# Patient Record
Sex: FEMALE | Race: BLACK OR AFRICAN AMERICAN | NOT HISPANIC OR LATINO
[De-identification: names, ages, dates, MRNs, and addresses within clinical notes are randomized per-mention and may not be internally consistent; named-entity substitution may affect disease eponyms.]

---

## 2019-10-30 ENCOUNTER — FORM ENCOUNTER (OUTPATIENT)
Age: 59
End: 2019-10-30

## 2019-11-05 ENCOUNTER — FORM ENCOUNTER (OUTPATIENT)
Age: 59
End: 2019-11-05

## 2019-11-14 ENCOUNTER — FORM ENCOUNTER (OUTPATIENT)
Age: 59
End: 2019-11-14

## 2019-11-18 ENCOUNTER — FORM ENCOUNTER (OUTPATIENT)
Age: 59
End: 2019-11-18

## 2019-12-01 ENCOUNTER — FORM ENCOUNTER (OUTPATIENT)
Age: 59
End: 2019-12-01

## 2019-12-03 ENCOUNTER — FORM ENCOUNTER (OUTPATIENT)
Age: 59
End: 2019-12-03

## 2020-01-26 ENCOUNTER — FORM ENCOUNTER (OUTPATIENT)
Age: 60
End: 2020-01-26

## 2020-02-09 ENCOUNTER — FORM ENCOUNTER (OUTPATIENT)
Age: 60
End: 2020-02-09

## 2020-08-18 ENCOUNTER — FORM ENCOUNTER (OUTPATIENT)
Age: 60
End: 2020-08-18

## 2020-08-19 ENCOUNTER — FORM ENCOUNTER (OUTPATIENT)
Age: 60
End: 2020-08-19

## 2021-02-08 PROBLEM — Z00.00 ENCOUNTER FOR PREVENTIVE HEALTH EXAMINATION: Status: ACTIVE | Noted: 2021-02-08

## 2021-02-11 PROBLEM — Z87.42 HISTORY OF OVARIAN CYST: Status: RESOLVED | Noted: 2021-02-11 | Resolved: 2021-02-11

## 2021-02-11 PROBLEM — F17.200 CURRENT SMOKER: Status: ACTIVE | Noted: 2021-02-11

## 2021-02-17 ENCOUNTER — APPOINTMENT (OUTPATIENT)
Dept: BREAST CENTER | Facility: CLINIC | Age: 61
End: 2021-02-17
Payer: COMMERCIAL

## 2021-02-17 VITALS
HEIGHT: 64 IN | WEIGHT: 152.25 LBS | BODY MASS INDEX: 25.99 KG/M2 | SYSTOLIC BLOOD PRESSURE: 141 MMHG | DIASTOLIC BLOOD PRESSURE: 84 MMHG | HEART RATE: 79 BPM

## 2021-02-17 DIAGNOSIS — F17.200 NICOTINE DEPENDENCE, UNSPECIFIED, UNCOMPLICATED: ICD-10-CM

## 2021-02-17 DIAGNOSIS — Z87.42 PERSONAL HISTORY OF OTHER DISEASES OF THE FEMALE GENITAL TRACT: ICD-10-CM

## 2021-02-17 PROCEDURE — 99214 OFFICE O/P EST MOD 30 MIN: CPT

## 2021-02-17 PROCEDURE — 99072 ADDL SUPL MATRL&STAF TM PHE: CPT

## 2021-02-17 RX ORDER — ACETAMINOPHEN AND CODEINE PHOSPHATE 300; 30 MG/1; MG/1
300-30 TABLET ORAL
Refills: 0 | Status: DISCONTINUED | COMMUNITY
End: 2021-02-17

## 2021-02-17 NOTE — PHYSICAL EXAM
[de-identified] : Bilateral Breast/Axilla/Supraclavicular Area: no masses, discharge, or adenopathy\par

## 2021-02-17 NOTE — PAST MEDICAL HISTORY
[Menarche Age ____] : age at menarche was [unfilled] [Menopause Age____] : age at menopause was [unfilled] [Total Preg ___] : G[unfilled] [Live Births ___] : P[unfilled]  [Age At Live Birth ___] : Age at live birth: [unfilled] [History of Hormone Replacement Treatment] : has no history of hormone replacement treatment [FreeTextEntry2] : 2 miscarriages [FreeTextEntry6] : never [FreeTextEntry7] : never [FreeTextEntry8] : no

## 2021-02-17 NOTE — HISTORY OF PRESENT ILLNESS
[FreeTextEntry1] : 19 Patient presents for breast breast surveillance. 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 s/p RT, on AI\par  Denies breast pain, palpable lumps, or nipple discharge. Family hx negative for breast cancer.\par  2019 B/L screening MG: L lower inner quad Asymmetric nodular density. BIRADS 0.\par  10/31/19 L diag M.8 cm partially lobulated and partially spiculated nodule in lower central region\par  L Diag US - 9:00 1.0 cm hypoechoic mass with indistinct margins and distal acoustic shadowing.\par  BIRADS 5. US guided biopsy recommended.\par  19 L US guided core bx - IDC poorly diff w/ focal micropapillary and apocrine features. ER + AL + HER- Concordant.\par  2019. L NL lump and SLNB - IDC, mod diff 1.3cm & DCIS, left new lateral margin DCIS, left axillary SLN negative for metastatic CA.\par 20: [facility] & US: scattered areas of fibroglandular density, L - 4cm mass at lumpx site c/w seroma, R - benign intramammary LN, US - L - 2.5cm complex cystic mass c/w post-op seroma 8:00 12FN. BIRADS 3. \par left us 6 month follow up not done

## 2021-08-25 PROBLEM — Z85.3 HISTORY OF LEFT BREAST CANCER: Status: RESOLVED | Noted: 2021-08-25 | Resolved: 2021-08-25

## 2021-08-25 PROBLEM — N64.9 DISORDER OF BREAST, UNSPECIFIED: Status: ACTIVE | Noted: 2021-02-11

## 2021-09-01 ENCOUNTER — APPOINTMENT (OUTPATIENT)
Dept: BREAST CENTER | Facility: CLINIC | Age: 61
End: 2021-09-01
Payer: COMMERCIAL

## 2021-09-01 VITALS
SYSTOLIC BLOOD PRESSURE: 135 MMHG | WEIGHT: 158 LBS | DIASTOLIC BLOOD PRESSURE: 89 MMHG | HEIGHT: 61 IN | HEART RATE: 71 BPM | BODY MASS INDEX: 29.83 KG/M2

## 2021-09-01 DIAGNOSIS — Z85.3 PERSONAL HISTORY OF MALIGNANT NEOPLASM OF BREAST: ICD-10-CM

## 2021-09-01 DIAGNOSIS — N64.9 DISORDER OF BREAST, UNSPECIFIED: ICD-10-CM

## 2021-09-01 DIAGNOSIS — Z78.9 OTHER SPECIFIED HEALTH STATUS: ICD-10-CM

## 2021-09-01 PROCEDURE — 99214 OFFICE O/P EST MOD 30 MIN: CPT

## 2021-10-13 ENCOUNTER — NON-APPOINTMENT (OUTPATIENT)
Age: 61
End: 2021-10-13

## 2021-11-16 ENCOUNTER — APPOINTMENT (OUTPATIENT)
Dept: PLASTIC SURGERY | Facility: CLINIC | Age: 61
End: 2021-11-16
Payer: COMMERCIAL

## 2021-11-16 VITALS — OXYGEN SATURATION: 99 % | BODY MASS INDEX: 29.45 KG/M2 | HEIGHT: 61 IN | WEIGHT: 156 LBS | HEART RATE: 81 BPM

## 2021-11-16 DIAGNOSIS — N62 HYPERTROPHY OF BREAST: ICD-10-CM

## 2021-11-16 DIAGNOSIS — N65.0 DEFORMITY OF RECONSTRUCTED BREAST: ICD-10-CM

## 2021-11-16 DIAGNOSIS — M25.519 PAIN IN UNSPECIFIED SHOULDER: ICD-10-CM

## 2021-11-16 DIAGNOSIS — M54.2 CERVICALGIA: ICD-10-CM

## 2021-11-16 DIAGNOSIS — N65.1 DISPROPORTION OF RECONSTRUCTED BREAST: ICD-10-CM

## 2021-11-16 PROCEDURE — 99204 OFFICE O/P NEW MOD 45 MIN: CPT

## 2021-11-16 RX ORDER — ASPIRIN 81 MG
81 TABLET, DELAYED RELEASE (ENTERIC COATED) ORAL
Refills: 0 | Status: DISCONTINUED | COMMUNITY
End: 2021-11-16

## 2021-11-16 NOTE — ASSESSMENT
[FreeTextEntry1] : I reviewed with the patient in detail the risks, benefits, and alternatives of bilateral reduction mammoplasty. I explained to her that the goals of the operation are to reduce the size of the breast mounds and to tighten the skin envelope and raise the nipple areola complex. I explained the scar burden associated with this operation and I showed her pictures. I explained the risk of bleeding, infection, delayed wound healing, residual asymmetry, decreased sensation to the nipple areola complex, loss of the nipple areola complex, suboptimal scarring and need for revision surgery. The surgery is approximately 3.5 hours and the recovery is approximately 2 weeks - avoiding any strenuous physical activity with 1-2 weeks out of work.\par \par Becasue of the signifiacnt radiation damage to the left breast and induration as well as her current smoking she would be at high risk for wound healing complications, fat necrosis, infection, NAC loss on the left side. therefore at most we can perform a left breast skin only / skin plus minimal parenchymal dissection conservative reduction/mastopexy and right breast reduction for symmetry. She needs to stop smoking for 6 months prior to any surgery. she reports a lcak of nipple sensation currently. \par \par She will stop smoking and return in 3 months for cotinine test and then we can plan for surgery 3 months later\par

## 2021-11-16 NOTE — HISTORY OF PRESENT ILLNESS
[FreeTextEntry1] : 62 y/o F diagnosed with left breast cancer 2 years ago referred by Dr. Tran presents for initial consultation regarding symptomatic macromastia. She c/o 3 years of shoulder pain. She uses a heating pad to relieve the pain. She has not tried OTC NSAIDs and Tylenol. She was not seen by physical therapist. She has chronic indentation of her bra straps in her shoulders. She does not have any redness and irritation underneath her breasts. She wears a size 38 DDD bra. Denies any reduced sensation in her nipples b/l. She had a breast ultrasound and mammogram in October. Patient had a lumpectomy in 2019 and radiation completed in February 2020. No previous breast biopsies. No family hx of breast cancer. No history of ovarian cancer. \par \par No hx of DVTs or blood clots. no family hx of blood clots or bleeding disorders. VTE risk factor score = 7\par \par Currently smokes 3-6 cigarettes a day.

## 2021-11-16 NOTE — PHYSICAL EXAM
[Bra Size: _______] : Bra Size: [unfilled] [de-identified] : b/l pendulous breasts, macromastia, R significantly larger than L, well healed transverse scar left breast lower quadrant 7 o'clock to 5 o'clock, no masses, no nipple discharge. SN - N: L 35.5 m  R 37 cm, BD: 19 cm bilaterally, grade III ptosis, left breast firm, indurated, radiation induced fibrosis. Left nipple w/ decreased sensation.\par

## 2022-02-07 ENCOUNTER — NON-APPOINTMENT (OUTPATIENT)
Age: 62
End: 2022-02-07

## 2022-02-09 ENCOUNTER — APPOINTMENT (OUTPATIENT)
Dept: PLASTIC SURGERY | Facility: CLINIC | Age: 62
End: 2022-02-09

## 2022-03-14 ENCOUNTER — APPOINTMENT (OUTPATIENT)
Dept: BREAST CENTER | Facility: CLINIC | Age: 62
End: 2022-03-14
Payer: COMMERCIAL

## 2022-03-14 ENCOUNTER — NON-APPOINTMENT (OUTPATIENT)
Age: 62
End: 2022-03-14

## 2022-03-14 VITALS
BODY MASS INDEX: 26.31 KG/M2 | SYSTOLIC BLOOD PRESSURE: 153 MMHG | WEIGHT: 154.13 LBS | DIASTOLIC BLOOD PRESSURE: 94 MMHG | HEIGHT: 64 IN | HEART RATE: 77 BPM

## 2022-03-14 DIAGNOSIS — Z85.3 PERSONAL HISTORY OF MALIGNANT NEOPLASM OF BREAST: ICD-10-CM

## 2022-03-14 DIAGNOSIS — Z78.9 OTHER SPECIFIED HEALTH STATUS: ICD-10-CM

## 2022-03-14 PROCEDURE — 99213 OFFICE O/P EST LOW 20 MIN: CPT

## 2022-03-14 NOTE — HISTORY OF PRESENT ILLNESS
[FreeTextEntry1] : Patient is a 62yo A.A. F who presents for breast breast surveillance. 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 Oncotype 22. S/p RT and currently on AI. Patient met w/ Dr. Lerman to discuss b/l breast reduction (she is hoping to do it over the summer). No fhx of breast or ovarian cancer. Patient denies palpable masses, skin changes, or nipple discharge bilaterally.\par \par \par 8/12/19: B/L screening MG: L lower inner quad Asymmetric nodular density. BIRADS 0.\par 10/31/19: L MG & US: 1.8 cm partially lobulated and partially spiculated nodule in lower central region. 9:00 1.0 cm hypoechoic mass with indistinct margins and distal acoustic shadowing. BIRADS 5. US guided biopsy recommended.\par 11/06/19: L US guided core bx - IDC poorly diff w/ focal micropapillary and apocrine features. ER + [place] + HER- Concordant.\par 11/19/19: L NL lump and SLNB - IDC, mod diff 1.3cm & DCIS, left new lateral margin DCIS, left axillary SLN negative for metastatic CA.\par 8/20/20: B/l MG & US: scattered areas of fibroglandular density, L - 4cm mass at lumpx site c/w seroma, R - benign intramammary LN, US - L - 2.5cm complex cystic mass c/w post-op seroma 8:00 12FN. BIRADS 3. \par 9/1/21: B/L MG & US- L- complex cystic mass at lumpx site stable since prior study measuring 4 cm c/w seroma or fat necrosis, BIRADS 2

## 2022-03-14 NOTE — REVIEW OF SYSTEMS
[Fever] : no fever [Chills] : no chills [Shortness Of Breath] : no shortness of breath [Cough] : no cough [Skin Lesions] : no skin lesions [Skin Wound] : no skin wound

## 2022-03-14 NOTE — PAST MEDICAL HISTORY
[Abortions ___] : Abortions:[unfilled] [History of Hormone Replacement Treatment] : has no history of hormone replacement treatment [FreeTextEntry2] : 1 miscarriages [FreeTextEntry6] : never [FreeTextEntry7] : never [FreeTextEntry8] : no

## 2022-03-14 NOTE — PHYSICAL EXAM
[de-identified] : \par  [de-identified] : Breast/Axilla/Supraclavicular Area: no masses, discharge, or adenopathy [de-identified] : Breast/Axilla/Supraclavicular Area: no evidence of recurrence

## 2022-11-21 ENCOUNTER — APPOINTMENT (OUTPATIENT)
Dept: BREAST CENTER | Facility: CLINIC | Age: 62
End: 2022-11-21

## 2022-11-21 VITALS — BODY MASS INDEX: 25.78 KG/M2 | WEIGHT: 151 LBS | HEIGHT: 64 IN

## 2022-11-21 VITALS — DIASTOLIC BLOOD PRESSURE: 102 MMHG | SYSTOLIC BLOOD PRESSURE: 172 MMHG | HEART RATE: 72 BPM

## 2022-11-21 DIAGNOSIS — Z85.3 PERSONAL HISTORY OF MALIGNANT NEOPLASM OF BREAST: ICD-10-CM

## 2022-11-21 PROCEDURE — 99213 OFFICE O/P EST LOW 20 MIN: CPT

## 2022-11-21 RX ORDER — ANASTROZOLE TABLETS 1 MG/1
TABLET ORAL
Refills: 0 | Status: COMPLETED | COMMUNITY
End: 2022-11-21

## 2022-11-21 RX ORDER — ANASTROZOLE TABLETS 1 MG/1
TABLET ORAL
Refills: 0 | Status: ACTIVE | COMMUNITY

## 2022-11-21 NOTE — PHYSICAL EXAM
[Normocephalic] : normocephalic [EOMI] : extra ocular movement intact [Supple] : supple [No Supraclavicular Adenopathy] : no supraclavicular adenopathy [No Cervical Adenopathy] : no cervical adenopathy [de-identified] : \par  [de-identified] : Breast/Axilla/Supraclavicular Area: no masses, discharge, or adenopathy [de-identified] : Breast/Axilla/Supraclavicular Area: no evidence of recurrence

## 2022-11-21 NOTE — HISTORY OF PRESENT ILLNESS
[FreeTextEntry1] : Patient is a 63yo A.A. F who presents for breast breast surveillance. S/p LEFT breast NL lumpectomy and SLNB in 11/2019 (age 58) for 1.3 cm IDC.ER +, HER2-. Oncotype 22. S/p RT and currently on AI at decreased dose due to shoulder pain. Patient met w/ Dr. Lerman to discuss b/l breast reduction.. No fhx of breast or ovarian cancer. Patient denies palpable masses, skin changes, or nipple discharge bilaterally.\par \par \par 8/12/19: B/L screening MG: L lower inner quad Asymmetric nodular density. BIRADS 0.\par 10/31/19: L MG & US: 1.8 cm partially lobulated and partially spiculated nodule in lower central region. 9:00 1.0 cm hypoechoic mass with indistinct margins and distal acoustic shadowing. BIRADS 5. US guided biopsy recommended.\par 11/06/19: L US guided core bx - IDC poorly diff w/ focal micropapillary and apocrine features. ER + [place] + HER- Concordant.\par 11/19/19: L NL lump and SLNB - IDC, mod diff 1.3cm & DCIS, left new lateral margin DCIS, left axillary SLN negative for metastatic CA.\par 8/20/20: B/l MG & US: scattered areas of fibroglandular density, L - 4cm mass at lumpx site c/w seroma, R - benign intramammary LN, US - L - 2.5cm complex cystic mass c/w post-op seroma 8:00 12FN. BIRADS 3. \par 9/1/21: B/L MG & US- L- complex cystic mass at lumpx site stable since prior study measuring 4 cm c/w seroma or fat necrosis, BIRADS 2\par 11/21/2022 ([facility]) B/L MG/US: scattered areas of fibroglandular density, benign appearing calcs, JAYANT. BIRADS 2

## 2022-11-21 NOTE — PAST MEDICAL HISTORY
[Menarche Age ____] : age at menarche was [unfilled] [Total Preg ___] : G[unfilled] [Live Births ___] : P[unfilled]  [Abortions ___] : Abortions:[unfilled] [Age At Live Birth ___] : Age at live birth: [unfilled] [Menopause Age____] : age at menopause was [unfilled] [History of Hormone Replacement Treatment] : has no history of hormone replacement treatment [FreeTextEntry2] : 1 miscarriage [FreeTextEntry6] : never [FreeTextEntry7] : never [FreeTextEntry8] : no

## 2023-11-29 ENCOUNTER — NON-APPOINTMENT (OUTPATIENT)
Age: 63
End: 2023-11-29

## 2023-11-29 ENCOUNTER — APPOINTMENT (OUTPATIENT)
Dept: BREAST CENTER | Facility: CLINIC | Age: 63
End: 2023-11-29

## 2023-12-01 ENCOUNTER — EMERGENCY (EMERGENCY)
Facility: HOSPITAL | Age: 63
LOS: 1 days | Discharge: HOME | End: 2023-12-01
Attending: STUDENT IN AN ORGANIZED HEALTH CARE EDUCATION/TRAINING PROGRAM | Admitting: EMERGENCY MEDICINE
Payer: MEDICARE

## 2023-12-01 VITALS
OXYGEN SATURATION: 97 % | DIASTOLIC BLOOD PRESSURE: 93 MMHG | HEART RATE: 82 BPM | RESPIRATION RATE: 18 BRPM | TEMPERATURE: 97 F | SYSTOLIC BLOOD PRESSURE: 200 MMHG

## 2023-12-01 VITALS
SYSTOLIC BLOOD PRESSURE: 132 MMHG | HEART RATE: 71 BPM | OXYGEN SATURATION: 98 % | DIASTOLIC BLOOD PRESSURE: 87 MMHG | TEMPERATURE: 98 F | RESPIRATION RATE: 18 BRPM

## 2023-12-01 DIAGNOSIS — N93.9 ABNORMAL UTERINE AND VAGINAL BLEEDING, UNSPECIFIED: ICD-10-CM

## 2023-12-01 DIAGNOSIS — Z85.3 PERSONAL HISTORY OF MALIGNANT NEOPLASM OF BREAST: ICD-10-CM

## 2023-12-01 LAB
APPEARANCE UR: CLEAR — SIGNIFICANT CHANGE UP
APPEARANCE UR: CLEAR — SIGNIFICANT CHANGE UP
BACTERIA # UR AUTO: NEGATIVE /HPF — SIGNIFICANT CHANGE UP
BACTERIA # UR AUTO: NEGATIVE /HPF — SIGNIFICANT CHANGE UP
BILIRUB UR-MCNC: NEGATIVE — SIGNIFICANT CHANGE UP
BILIRUB UR-MCNC: NEGATIVE — SIGNIFICANT CHANGE UP
COLOR SPEC: YELLOW — SIGNIFICANT CHANGE UP
COLOR SPEC: YELLOW — SIGNIFICANT CHANGE UP
DIFF PNL FLD: ABNORMAL
DIFF PNL FLD: ABNORMAL
GLUCOSE UR QL: NEGATIVE MG/DL — SIGNIFICANT CHANGE UP
GLUCOSE UR QL: NEGATIVE MG/DL — SIGNIFICANT CHANGE UP
KETONES UR-MCNC: NEGATIVE MG/DL — SIGNIFICANT CHANGE UP
KETONES UR-MCNC: NEGATIVE MG/DL — SIGNIFICANT CHANGE UP
LEUKOCYTE ESTERASE UR-ACNC: ABNORMAL
LEUKOCYTE ESTERASE UR-ACNC: ABNORMAL
NITRITE UR-MCNC: NEGATIVE — SIGNIFICANT CHANGE UP
NITRITE UR-MCNC: NEGATIVE — SIGNIFICANT CHANGE UP
PH UR: 6 — SIGNIFICANT CHANGE UP (ref 5–8)
PH UR: 6 — SIGNIFICANT CHANGE UP (ref 5–8)
PROT UR-MCNC: SIGNIFICANT CHANGE UP MG/DL
PROT UR-MCNC: SIGNIFICANT CHANGE UP MG/DL
RBC CASTS # UR COMP ASSIST: 8 /HPF — HIGH (ref 0–4)
RBC CASTS # UR COMP ASSIST: 8 /HPF — HIGH (ref 0–4)
SP GR SPEC: 1.01 — SIGNIFICANT CHANGE UP (ref 1–1.03)
SP GR SPEC: 1.01 — SIGNIFICANT CHANGE UP (ref 1–1.03)
SQUAMOUS # UR AUTO: 1 /HPF — SIGNIFICANT CHANGE UP (ref 0–5)
SQUAMOUS # UR AUTO: 1 /HPF — SIGNIFICANT CHANGE UP (ref 0–5)
UROBILINOGEN FLD QL: 0.2 MG/DL — SIGNIFICANT CHANGE UP (ref 0.2–1)
UROBILINOGEN FLD QL: 0.2 MG/DL — SIGNIFICANT CHANGE UP (ref 0.2–1)
WBC UR QL: 2 /HPF — SIGNIFICANT CHANGE UP (ref 0–5)
WBC UR QL: 2 /HPF — SIGNIFICANT CHANGE UP (ref 0–5)

## 2023-12-01 PROCEDURE — 99283 EMERGENCY DEPT VISIT LOW MDM: CPT

## 2023-12-01 PROCEDURE — 81001 URINALYSIS AUTO W/SCOPE: CPT

## 2023-12-01 PROCEDURE — 99284 EMERGENCY DEPT VISIT MOD MDM: CPT

## 2023-12-01 NOTE — ED PROVIDER NOTE - NSFOLLOWUPINSTRUCTIONS_ED_ALL_ED_FT
You were seen in the Emergency Department for: vaginal bleeding    Please follow up with an OBGYN as discussed. You were referred to our Referral Coordinator and you will be contacted within 48-72 hours to help set up an appointment. If you are not contacted within that time, please call [phone] to find a provider who is convenient for you.    You should return to the Emergency Department if you feel any new/worsening/persistent symptoms including but not limited to: chest pain, difficulty breathing, loss of consciousness, bleeding, uncontrolled pain, numbness/weakness of a body part You were seen in the Emergency Department for: vaginal bleeding    Please follow up with an OBGYN as discussed. You were referred to our Referral Coordinator and you will be contacted within 48-72 hours to help set up an appointment. If you are not contacted within that time, please call [phone] to find a provider who is convenient for you.    Your blood pressure was noted to be elevated. Please speak with your primary physician as soon as possible regarding this finding. You may need to start medications to lower your blood pressure.    You should return to the Emergency Department if you feel any new/worsening/persistent symptoms including but not limited to: chest pain, difficulty breathing, loss of consciousness, bleeding, uncontrolled pain, numbness/weakness of a body part

## 2023-12-01 NOTE — ED PROVIDER NOTE - PHYSICAL EXAMINATION
Gen - NAD; well-appearing; A+Ox3   HEENT - NCAT, EOMI  Neck - supple  Resp - CTAB, no increased WOB  CV -  RRR, no m/r/g  Abd - soft, NT, ND; no guarding or rebound  MSK - FROM of b/l UE and LE, no gross deformities  Extrem - no LE edema/erythema/tenderness  Neuro - no focal motor or sensation deficits  Skin - warm, well perfused  Pelvic (chaperone RAISSA Hall) - nl ext genitalia, +scant evidence of bleeding in vaginal vault, no appreciable wound/trauma/mass

## 2023-12-01 NOTE — ED PROVIDER NOTE - NSPTACCESSSVCSAPPTDETAILS_ED_ALL_ED_FT
postmenopausal vaginal bleeding, concern for possible uterine cancer    patient phone number [phone]

## 2023-12-01 NOTE — ED ADULT TRIAGE NOTE - TEMPERATURE IN FAHRENHEIT (DEGREES F)
normal appearance , without tenderness upon palpation , no deformities , trachea midline , Thyroid normal size , no thyroid nodules , no masses , no JVD , thyroid nontender 97.2

## 2023-12-01 NOTE — ED PROVIDER NOTE - CLINICAL SUMMARY MEDICAL DECISION MAKING FREE TEXT BOX
63 year old female with history of breast CA on hormonal therapy presenting with AUB x ~1 wk. Overall well appearing here, comfortable, noted to be very hypertensive on arrival but has no evidence of end organ dysfunction. Patient with known dx of hypertension but has not started meds yet--needs f/u with PMD. Pelvic here is overall reassuring with scant amount of blood within vaginal vault. Explained to patient that she needs r/o of possible uterine CA given postmenopausal onset. Will provide expedited GYN f/u via referral coordinator.

## 2023-12-01 NOTE — ED ADULT NURSE NOTE - NSFALLUNIVINTERV_ED_ALL_ED
Bed/Stretcher in lowest position, wheels locked, appropriate side rails in place/Call bell, personal items and telephone in reach/Instruct patient to call for assistance before getting out of bed/chair/stretcher/Non-slip footwear applied when patient is off stretcher/[place] to call system/Physically safe environment - no spills, clutter or unnecessary equipment/Purposeful proactive rounding/Room/bathroom lighting operational, light cord in reach

## 2023-12-01 NOTE — ED ADULT NURSE NOTE - OBJECTIVE STATEMENT
Pt presents to ED c/o vaginal bleed x~1week. Pt states last week she felt constipated so took some laxatives, had BM afterwards, "pushed hard", then since has noticed mild amount of vaginal bleeding, notices usually when she urinates. History of breast CA  Denies fevers, chills, chest pain, sob, abdominal pain, n/v/d, dizziness, AC use, weakness. A&Ox4

## 2023-12-01 NOTE — ED PROVIDER NOTE - OBJECTIVE STATEMENT
63 year old female with history of breast CA on hormonal therapy presenting with vaginal bleeding x ~1 wk. States last week she felt constipated so took some laxatives, had BM afterwards, "pushed hard", then since has noticed mild amount of vaginal bleeding, notices usually when she urinates. Denies fevers, chills, chest pain, sob, abdominal pain, n/v/d. Does feel some "fullness" in lower abdomen. No dizziness, AC use, weakness.

## 2023-12-01 NOTE — ED PROVIDER NOTE - PATIENT PORTAL LINK FT
You can access the FollowMyHealth Patient Portal offered by [facility] by registering at the following website: [email or web address]. By joining [employer]’s FollowMyHealth portal, you will also be able to view your health information using other applications (apps) compatible with our system.

## 2023-12-01 NOTE — ED ADULT TRIAGE NOTE - ARRIVAL INFO ADDITIONAL COMMENTS
pt c/o intermittent vaginal bleeding that began on sunday and only happens when she voids.  no cramping.   of note pt is currently being treated for her HTN but has not started meds yet.

## 2023-12-05 PROBLEM — C50.919 MALIGNANT NEOPLASM OF UNSPECIFIED SITE OF UNSPECIFIED FEMALE BREAST: Chronic | Status: ACTIVE | Noted: 2023-12-01

## 2023-12-14 ENCOUNTER — APPOINTMENT (OUTPATIENT)
Dept: OBGYN | Facility: CLINIC | Age: 63
End: 2023-12-14

## 2024-02-25 NOTE — HISTORY OF PRESENT ILLNESS
[FreeTextEntry1] : Patient is a 62yo A.A. F who presents for breast breast surveillance. S/p LEFT breast NL lumpectomy and SLNB in 11/2019 (age 58) for 1.3 cm IDC (ER +, HER2-). Oncotype 22. S/p RT and currently on AI at decreased dose due to shoulder pain. Patient met w/ Dr. Lerman 9/2021 to discuss b/l breast reduction. No fhx of breast or ovarian cancer. Patient denies palpable masses, skin changes, or nipple discharge bilaterally.  8/12/19: B/L MG- L lower inner quad Asymmetric nodular density. BIRADS 0. 10/31/19: L MG & US- 1.8 cm partially lobulated and partially spiculated nodule in lower central region. 9:00 1.0 cm hypoechoic mass with indistinct margins and distal acoustic shadowing. BIRADS 5. US guided biopsy recommended. 11/06/19: L US guided core bx- IDC poorly diff w/ focal micropapillary and apocrine features. ER + [place] + HER- Concordant. 11/19/19: L NL lump and SLNB- IDC, mod diff 1.3cm & DCIS, left new lateral margin DCIS, left axillary SLN negative for metastatic CA. 8/20/20: B/L MG & US- scattered areas of fibroglandular density, L - 4cm mass at lumpx site c/w seroma, R - benign intramammary LN, US - L - 2.5cm complex cystic mass c/w post-op seroma 8:00 12FN. BIRADS 3.  9/1/21: B/L MG & US- L- complex cystic mass at lumpx site stable since prior study measuring 4 cm c/w seroma or fat necrosis, BIRADS 2 11/21/22: B/L MG & US- scattered areas of fibroglandular density, benign appearing calcs, JAYANT. BIRADS 2  2/29/24: B/L MG & US- scheduled

## 2024-02-25 NOTE — REVIEW OF SYSTEMS
[Chills] : no chills [Fever] : no fever [Shortness Of Breath] : no shortness of breath [Cough] : no cough [Skin Lesions] : no skin lesions [Skin Wound] : no skin wound

## 2024-02-25 NOTE — PHYSICAL EXAM
[de-identified] : Breast/Axilla/Supraclavicular Area: no masses, discharge, or adenopathy [de-identified] : \par   [de-identified] : Breast/Axilla/Supraclavicular Area: no evidence of recurrence

## 2024-02-25 NOTE — PAST MEDICAL HISTORY
[FreeTextEntry2] : 1 miscarriage [History of Hormone Replacement Treatment] : has no history of hormone replacement treatment [FreeTextEntry6] : never [FreeTextEntry7] : never [FreeTextEntry8] : no

## 2024-02-26 ENCOUNTER — NON-APPOINTMENT (OUTPATIENT)
Age: 64
End: 2024-02-26

## 2024-02-29 ENCOUNTER — NON-APPOINTMENT (OUTPATIENT)
Age: 64
End: 2024-02-29

## 2024-02-29 ENCOUNTER — APPOINTMENT (OUTPATIENT)
Dept: BREAST CENTER | Facility: CLINIC | Age: 64
End: 2024-02-29